# Patient Record
Sex: FEMALE | Race: WHITE | NOT HISPANIC OR LATINO | URBAN - METROPOLITAN AREA
[De-identification: names, ages, dates, MRNs, and addresses within clinical notes are randomized per-mention and may not be internally consistent; named-entity substitution may affect disease eponyms.]

---

## 2019-02-06 ENCOUNTER — OFFICE (OUTPATIENT)
Dept: URBAN - METROPOLITAN AREA CLINIC 101 | Facility: CLINIC | Age: 39
End: 2019-02-06

## 2019-02-06 VITALS
HEART RATE: 93 BPM | HEIGHT: 67 IN | TEMPERATURE: 97.9 F | WEIGHT: 214 LBS | DIASTOLIC BLOOD PRESSURE: 75 MMHG | SYSTOLIC BLOOD PRESSURE: 109 MMHG

## 2019-02-06 DIAGNOSIS — K21.9 GASTRO-ESOPHAGEAL REFLUX DISEASE WITHOUT ESOPHAGITIS: ICD-10-CM

## 2019-02-06 PROCEDURE — 99203 OFFICE O/P NEW LOW 30 MIN: CPT

## 2019-02-06 RX ORDER — FAMOTIDINE 20 MG/1
40 TABLET, FILM COATED ORAL
Qty: 60 | Refills: 6 | Status: ACTIVE
Start: 2019-02-06

## 2019-02-06 NOTE — SERVICEHPINOTES
PAT JOSEPH   is a   38  female who presents with acid reflux. Went to [facility] ER in June, 2018 with chest palpitations. Cardiac etiology was ruled out. Has been taking Famotidine 20 mg once in AM. Has been experiencing burning and pressure sensation over the chest. No longer has palpitations. Some nausea without vomiting. These symptoms are worse after eating and at night. + Cough. Tomato based and greasy food seems to aggravate the heartburn. Denies dysphagia, dyspepsia, early satiety or weight loss. + Weight gain over a couple of years. BRHas a regular bowel movement every day. Denies blood in stool, melena or abdominal pain.Denies chest pain, palpitations or sob with exertion. BRTakes Ibuprofen once a week for headache. Denies family history of colon cancer.